# Patient Record
Sex: FEMALE | Race: WHITE | URBAN - METROPOLITAN AREA
[De-identification: names, ages, dates, MRNs, and addresses within clinical notes are randomized per-mention and may not be internally consistent; named-entity substitution may affect disease eponyms.]

---

## 2022-07-26 ENCOUNTER — OFFICE VISIT (OUTPATIENT)
Dept: URBAN - METROPOLITAN AREA CLINIC 13 | Facility: CLINIC | Age: 72
End: 2022-07-26
Payer: MEDICARE

## 2022-07-26 DIAGNOSIS — H35.3112 NONEXUDATIVE AGE-RELATED MACULAR DEGENERATION, RIGHT EYE, INTERMEDIATE DRY STAGE: ICD-10-CM

## 2022-07-26 DIAGNOSIS — H40.89 OTHER SPECIFIED GLAUCOMA: ICD-10-CM

## 2022-07-26 DIAGNOSIS — E11.3313 TYPE 2 DIAB WITH MODERATE NONP RTNOP WITH MACULAR EDEMA, BI: ICD-10-CM

## 2022-07-26 DIAGNOSIS — H25.12 AGE-RELATED NUCLEAR CATARACT, LEFT EYE: Primary | ICD-10-CM

## 2022-07-26 PROCEDURE — 99204 OFFICE O/P NEW MOD 45 MIN: CPT | Performed by: OPHTHALMOLOGY

## 2022-07-26 PROCEDURE — 76512 OPH US DX B-SCAN: CPT | Performed by: OPHTHALMOLOGY

## 2022-07-26 PROCEDURE — 92134 CPTRZ OPH DX IMG PST SGM RTA: CPT | Performed by: OPHTHALMOLOGY

## 2022-07-26 ASSESSMENT — INTRAOCULAR PRESSURE
OS: 20
OD: 18

## 2022-07-26 NOTE — IMPRESSION/PLAN
Impression: Age-related nuclear cataract, left eye: H25.12. B-scan OS 07/26/2022 = no mass/RD Plan: Mature cataract No retinal contraindication for cataract surgery. D/w patient that retinal/macular pathology may limit final visual outcome. Patient voices understanding.

## 2022-07-26 NOTE — IMPRESSION/PLAN
Impression: Other specified glaucoma: H40.89. Plan: PXF glaucoma Is starting latanaprost per her referring provider

## 2022-07-26 NOTE — IMPRESSION/PLAN
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 OCT OU = mild IRF OD no view OS
A1c = 6 Plan: The patient is a type 2 diabetic with nonproliferative diabetic retinopathy (NPDR) and diabetic macular edema that is fortunately not clinically significant. BS/BP control emphasized with patient. We reviewed careful observation for progression. We also reviewed potential treatment options in the future including focal laser, intravitreal injections, and research participation. Rec obs as per protocol V. The patient elects to observe and return 3 months 3m OCT OU re-eval Avastin

## 2022-08-30 ENCOUNTER — OFFICE VISIT (OUTPATIENT)
Dept: URBAN - METROPOLITAN AREA CLINIC 13 | Facility: CLINIC | Age: 72
End: 2022-08-30
Payer: MEDICARE

## 2022-08-30 DIAGNOSIS — H40.89 OTHER SPECIFIED GLAUCOMA: ICD-10-CM

## 2022-08-30 DIAGNOSIS — E11.3313 TYPE 2 DIAB WITH MODERATE NONP RTNOP WITH MACULAR EDEMA, BI: ICD-10-CM

## 2022-08-30 DIAGNOSIS — H25.12 AGE-RELATED NUCLEAR CATARACT, LEFT EYE: Primary | ICD-10-CM

## 2022-08-30 DIAGNOSIS — H35.3112 NONEXUDATIVE AGE-RELATED MACULAR DEGENERATION, RIGHT EYE, INTERMEDIATE DRY STAGE: ICD-10-CM

## 2022-08-30 PROCEDURE — 92134 CPTRZ OPH DX IMG PST SGM RTA: CPT | Performed by: OPHTHALMOLOGY

## 2022-08-30 PROCEDURE — 99213 OFFICE O/P EST LOW 20 MIN: CPT | Performed by: OPHTHALMOLOGY

## 2022-08-30 ASSESSMENT — INTRAOCULAR PRESSURE
OS: 13
OD: 9

## 2022-08-30 NOTE — IMPRESSION/PLAN
Impression: Other specified glaucoma: H40.89. Plan: PXF glaucoma IOP ok on latanaprost per her referring provider

## 2022-08-30 NOTE — IMPRESSION/PLAN
Impression: Age-related nuclear cataract, left eye: H25.12. B-scan OS 07/26/2022 = no mass/RD Plan: Mature cataract. D/w Dr. Quentin Benoit on the phone. Agree that she may not be optimal candidate for standard phaco, whereas posterior approach would be better option. We discussed the risks, benefits, indications, and alternatives to vitrectomy and lensectomy. The risks include, but are not limited to infection, retinal tear or detachment, glaucoma, hemorrhage, loss of eye and blindness, chronic CME, and need for additional surgery. The patient understands that with vitrectomy, the vision can improve, but sometimes does not improve fully and sometimes the vision does not improve at all. Also, it can take months to years for the vision to improve. The patient elects to proceed with vitrectomy surgery/sutured lens vs ACIOL 25g PPV/PPL (fragmentome)/sutured IOL vs ACIOL OS [place]. NO GAS for surgery. D/w patient the possible need for SO and then staged SO removal in the future. Patient voices understanding.

## 2022-08-30 NOTE — IMPRESSION/PLAN
Impression: Type 2 diab with moderate nonp rtnop with macular edema, bi: [id number]. OCT OU = mild IRF OD , no view OS
A1c = 6 Plan: The patient is a type 2 diabetic with nonproliferative diabetic retinopathy (NPDR) and diabetic macular edema that is fortunately not clinically significant. BS/BP control emphasized with patient. We reviewed careful observation for progression. We also reviewed potential treatment options in the future including focal laser, intravitreal injections, and research participation. Rec obs as per protocol V. The patient elects to observe and return 3 months 3m OCT OU re-eval Avastin

## 2022-09-28 ENCOUNTER — POST-OPERATIVE VISIT (OUTPATIENT)
Dept: URBAN - METROPOLITAN AREA EXTERNAL CLINIC 14 | Facility: EXTERNAL CLINIC | Age: 72
End: 2022-09-28
Payer: MEDICARE

## 2022-09-28 DIAGNOSIS — H25.12 AGE-RELATED NUCLEAR CATARACT, LEFT EYE: Primary | ICD-10-CM

## 2022-09-28 PROCEDURE — 99024 POSTOP FOLLOW-UP VISIT: CPT | Performed by: OPHTHALMOLOGY

## 2022-09-28 ASSESSMENT — INTRAOCULAR PRESSURE: OD: 14

## 2022-09-28 NOTE — IMPRESSION/PLAN
Impression: S/P 25g PPV/PPL (fragmentome)/sutured IOL vs ACIOL OS - . Age-related nuclear cataract, left eye  H25.12. Plan: No s/s of RD/infection VA on recheck = CF 1 IOP low, but AC formed, no leak, and no choroidals - rec obs Repatched after exam.  Keep patch on x 24 hours then remove - instructions given. Start using drops as below tomorrow. Post-operative instructions and precautions Reviewed. 
Call ASAP with changes
--Continue Ocuflox QID--Continue PF QID

1 week POS

## 2022-10-07 ENCOUNTER — POST-OPERATIVE VISIT (OUTPATIENT)
Dept: URBAN - METROPOLITAN AREA CLINIC 86 | Facility: CLINIC | Age: 72
End: 2022-10-07
Payer: MEDICARE

## 2022-10-07 DIAGNOSIS — H25.12 AGE-RELATED NUCLEAR CATARACT, LEFT EYE: Primary | ICD-10-CM

## 2022-10-07 PROCEDURE — 99024 POSTOP FOLLOW-UP VISIT: CPT | Performed by: OPHTHALMOLOGY

## 2022-10-07 ASSESSMENT — INTRAOCULAR PRESSURE
OD: 17
OS: 9

## 2022-10-07 NOTE — IMPRESSION/PLAN
Impression: S/P 25g PPV/PPL (fragmentome)/sutured IOL vs ACIOL OS - 9 Days. Age-related nuclear cataract, left eye  H25.12. Plan: Continue PF QID OS. Stop Oflox Continue Lantoprost

RTC As scheduled with Dr. Patricia Souza

## 2022-11-02 ENCOUNTER — POST-OPERATIVE VISIT (OUTPATIENT)
Dept: URBAN - METROPOLITAN AREA CLINIC 54 | Facility: CLINIC | Age: 72
End: 2022-11-02
Payer: MEDICARE

## 2022-11-02 DIAGNOSIS — H25.12 AGE-RELATED NUCLEAR CATARACT, LEFT EYE: Primary | ICD-10-CM

## 2022-11-02 PROCEDURE — 99024 POSTOP FOLLOW-UP VISIT: CPT | Performed by: OPHTHALMOLOGY

## 2022-11-02 ASSESSMENT — INTRAOCULAR PRESSURE
OD: 14
OS: 19

## 2022-11-02 NOTE — IMPRESSION/PLAN
Impression: S/P 25g PPV/PPL (fragmentome) ACIOL OS - 35 Days. Age-related nuclear cataract, left eye  H25.12.
OCT OS - ERM with tr IRF OS  Plan: No s/s of RD/infection VA/IOP acceptable ACIOL stable and centered ERM, but mild - rec obs Post-operative instructions and precautions Reviewed. Taper PF TID x 1 week, BID x 1 week, daily x 1 week and CSM with daily Call ASAP with changes Some loose  sutures. Rec removal at slit lamp. RBA's d/w patient including infection risk. Patient agrees to proceed. Sutures removed at slit lamp post betadine application in sterile fashion without complication. Antibiotic drop applied. Patient knows to call ASAP with changes. 

1 week cornea eval (no dilation) with us or Dr. Regina Denise
then 3m OCT OU

## 2022-11-11 ENCOUNTER — POST-OPERATIVE VISIT (OUTPATIENT)
Dept: URBAN - METROPOLITAN AREA CLINIC 87 | Facility: CLINIC | Age: 72
End: 2022-11-11
Payer: MEDICARE

## 2022-11-11 DIAGNOSIS — H25.12 AGE-RELATED NUCLEAR CATARACT, LEFT EYE: Primary | ICD-10-CM

## 2022-11-11 PROCEDURE — 99024 POSTOP FOLLOW-UP VISIT: CPT | Performed by: OPHTHALMOLOGY

## 2022-11-11 ASSESSMENT — INTRAOCULAR PRESSURE
OD: 19
OS: 23

## 2022-11-11 NOTE — IMPRESSION/PLAN
Impression: S/P 25g PPV/PPL (fragmentome)/sutured IOL vs ACIOL OS - 44 Days. Age-related nuclear cataract, left eye  H25.12. Plan: Taper off PF 3-2-1 weekly. 

RTC as scheduled with CLEVE

## 2023-02-01 ENCOUNTER — OFFICE VISIT (OUTPATIENT)
Dept: URBAN - METROPOLITAN AREA CLINIC 13 | Facility: CLINIC | Age: 73
End: 2023-02-01
Payer: MEDICARE

## 2023-02-01 DIAGNOSIS — H35.3112 NONEXUDATIVE AGE-RELATED MACULAR DEGENERATION, RIGHT EYE, INTERMEDIATE DRY STAGE: ICD-10-CM

## 2023-02-01 DIAGNOSIS — H35.81 RETINAL EDEMA: ICD-10-CM

## 2023-02-01 DIAGNOSIS — E11.3313 TYPE 2 DIAB WITH MODERATE NONP RTNOP WITH MACULAR EDEMA, BI: ICD-10-CM

## 2023-02-01 DIAGNOSIS — H40.89 OTHER SPECIFIED GLAUCOMA: ICD-10-CM

## 2023-02-01 DIAGNOSIS — H25.12 AGE-RELATED NUCLEAR CATARACT, LEFT EYE: Primary | ICD-10-CM

## 2023-02-01 PROCEDURE — 92235 FLUORESCEIN ANGRPH MLTIFRAME: CPT | Performed by: OPHTHALMOLOGY

## 2023-02-01 PROCEDURE — 92250 FUNDUS PHOTOGRAPHY W/I&R: CPT | Performed by: OPHTHALMOLOGY

## 2023-02-01 PROCEDURE — 92134 CPTRZ OPH DX IMG PST SGM RTA: CPT | Performed by: OPHTHALMOLOGY

## 2023-02-01 PROCEDURE — 99214 OFFICE O/P EST MOD 30 MIN: CPT | Performed by: OPHTHALMOLOGY

## 2023-02-01 RX ORDER — PREDNISOLONE ACETATE 10 MG/ML
1 % SUSPENSION/ DROPS OPHTHALMIC
Qty: 10 | Refills: 3 | Status: INACTIVE
Start: 2023-02-01 | End: 2023-03-02

## 2023-02-01 RX ORDER — KETOROLAC TROMETHAMINE 5 MG/ML
0.5 % SOLUTION OPHTHALMIC
Qty: 5 | Refills: 3 | Status: INACTIVE
Start: 2023-02-01 | End: 2023-03-02

## 2023-02-01 ASSESSMENT — INTRAOCULAR PRESSURE
OD: 18
OS: 25

## 2023-02-01 NOTE — IMPRESSION/PLAN
Impression: Other specified glaucoma: H40.89. Plan: PXF glaucoma IOP ok on timolol and latanaprost 
rec f/u with her referring provider

## 2023-02-01 NOTE — IMPRESSION/PLAN
Impression: Retinal edema: H35.81. Plan: The patient has cystoid macular edema. We reviewed treatment options including observation, topical NSAID and corticosteroid eye drops, subtenon's steroid injections, intravitreal steroid injections, and possibly anti-VEGF injections. We will begin conservatively with topical NSAID drops and corticosteroid eye drops. If the IOP is acceptable and significant CME remains, then we will consider a subtenon's or intravitreal steroid injection at the patient's next appointment. Does have inflammatory component - will check for Jamal Mccarty in the future 1m OCT OU re-eval Jamal Mccarty

## 2023-02-01 NOTE — IMPRESSION/PLAN
Impression: Type 2 diab with moderate nonp rtnop with macular edema, bi: [id number]. 
 Plan: Please see above

## 2023-02-01 NOTE — IMPRESSION/PLAN
Impression: Age-related nuclear cataract, left eye  H25.12. S/P 25g PPV/PPL (fragmentome) ACIOL OS (09/28/2022) OCT OU - no IRF/SRF OD, ERM with tr IRF OS  / 477 FA OU 02/01/2023 = stain of MAs but no leak/non-perfusion OD stain of MAs with dense leak around nerve and in mac c/w inflammation and CME OS Plan: ACIOL stable and centered ERM, but mild - rec obs Now with sig central edema - likely CME but cannot effectively r/o DME, given NPDR
rec Optos FA OU transit OS = c/w CME Please see retinal edema diagnosis

## 2023-03-08 ENCOUNTER — OFFICE VISIT (OUTPATIENT)
Dept: URBAN - METROPOLITAN AREA CLINIC 13 | Facility: CLINIC | Age: 73
End: 2023-03-08
Payer: MEDICARE

## 2023-03-08 DIAGNOSIS — H35.3112 NONEXUDATIVE AGE-RELATED MACULAR DEGENERATION, RIGHT EYE, INTERMEDIATE DRY STAGE: ICD-10-CM

## 2023-03-08 DIAGNOSIS — H35.81 RETINAL EDEMA: ICD-10-CM

## 2023-03-08 DIAGNOSIS — H40.89 OTHER SPECIFIED GLAUCOMA: ICD-10-CM

## 2023-03-08 DIAGNOSIS — H25.12 AGE-RELATED NUCLEAR CATARACT, LEFT EYE: Primary | ICD-10-CM

## 2023-03-08 DIAGNOSIS — E11.3313 TYPE 2 DIAB WITH MODERATE NONP RTNOP WITH MACULAR EDEMA, BI: ICD-10-CM

## 2023-03-08 PROCEDURE — 99212 OFFICE O/P EST SF 10 MIN: CPT | Performed by: OPHTHALMOLOGY

## 2023-03-08 PROCEDURE — 92134 CPTRZ OPH DX IMG PST SGM RTA: CPT | Performed by: OPHTHALMOLOGY

## 2023-03-08 ASSESSMENT — INTRAOCULAR PRESSURE
OS: 20
OD: 14

## 2023-03-08 NOTE — IMPRESSION/PLAN
Impression: Age-related nuclear cataract, left eye  H25.12. S/P 25g PPV/PPL (fragmentome) ACIOL OS (09/28/2022) OCT OU - no IRF/SRF OD, ERM with tr IRF OS  / 310 FA OU (02/01/2023) = stain of MAs but no leak/non-perfusion OD stain of MAs with dense leak around nerve and in mac c/w inflammation and CME OS Plan: ACIOL stable and centered ERM, but mild - rec obs Now with resolved central edema Please see retinal edema diagnosis

## 2023-03-08 NOTE — IMPRESSION/PLAN
Impression: Retinal edema: H35.81. Plan: Resolved on PF/Acular QID Taper to TID x 2 weeks, then stay on BID

2-3m OCT OU re-joaquin Machado

## 2023-03-08 NOTE — IMPRESSION/PLAN
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  Plan: Please see above

## 2023-05-09 ENCOUNTER — OFFICE VISIT (OUTPATIENT)
Dept: URBAN - METROPOLITAN AREA CLINIC 13 | Facility: CLINIC | Age: 73
End: 2023-05-09
Payer: MEDICARE

## 2023-05-09 DIAGNOSIS — H35.81 RETINAL EDEMA: ICD-10-CM

## 2023-05-09 DIAGNOSIS — H25.12 AGE-RELATED NUCLEAR CATARACT, LEFT EYE: Primary | ICD-10-CM

## 2023-05-09 DIAGNOSIS — H35.3112 NONEXUDATIVE AGE-RELATED MACULAR DEGENERATION, RIGHT EYE, INTERMEDIATE DRY STAGE: ICD-10-CM

## 2023-05-09 DIAGNOSIS — E11.3313 TYPE 2 DIAB WITH MODERATE NONP RTNOP WITH MACULAR EDEMA, BI: ICD-10-CM

## 2023-05-09 DIAGNOSIS — H40.89 OTHER SPECIFIED GLAUCOMA: ICD-10-CM

## 2023-05-09 PROCEDURE — 92134 CPTRZ OPH DX IMG PST SGM RTA: CPT | Performed by: OPHTHALMOLOGY

## 2023-05-09 PROCEDURE — 99213 OFFICE O/P EST LOW 20 MIN: CPT | Performed by: OPHTHALMOLOGY

## 2023-05-09 ASSESSMENT — INTRAOCULAR PRESSURE
OD: 18
OS: 18

## 2023-05-09 NOTE — IMPRESSION/PLAN
Impression: Type 2 diab with moderate nonp rtnop with macular edema, bi: [id number].  Plan: Please see above

## 2023-05-09 NOTE — IMPRESSION/PLAN
Impression: Retinal edema: H35.81. Plan: Remains resolved on PF/Acular BID

RBA's of CSM with BID or taper to daily. Patient elects taper PF and Acular to daily May consider d/c in the future. 

6 weeks OCT OU re-eval Xipere

## 2023-05-09 NOTE — IMPRESSION/PLAN
Impression: Age-related nuclear cataract, left eye  H25.12. S/P 25g PPV/PPL (fragmentome) ACIOL OS (09/28/2022) OCT OU - no IRF/SRF OD, ERM with tr IRF OS  / 347 FA OU (02/01/2023) = stain of MAs but no leak/non-perfusion OD stain of MAs with dense leak around nerve and in mac c/w inflammation and CME OS Plan: ACIOL stable and centered ERM, but mild - rec obs Now with resolved central edema Please see retinal edema diagnosis

## 2023-06-23 ENCOUNTER — OFFICE VISIT (OUTPATIENT)
Dept: URBAN - METROPOLITAN AREA CLINIC 87 | Facility: CLINIC | Age: 73
End: 2023-06-23
Payer: MEDICARE

## 2023-06-23 DIAGNOSIS — H35.3112 NONEXUDATIVE AGE-RELATED MACULAR DEGENERATION, RIGHT EYE, INTERMEDIATE DRY STAGE: ICD-10-CM

## 2023-06-23 DIAGNOSIS — E11.3313 TYPE 2 DIABETES MELLITUS WITH MODERATE NONPROLIFERATIVE DIABETIC RETINOPATHY WITH MACULAR EDEMA, BILATERAL: ICD-10-CM

## 2023-06-23 DIAGNOSIS — H25.12 AGE-RELATED NUCLEAR CATARACT, LEFT EYE: ICD-10-CM

## 2023-06-23 DIAGNOSIS — H40.89 OTHER SPECIFIED GLAUCOMA: ICD-10-CM

## 2023-06-23 DIAGNOSIS — H35.81 RETINAL EDEMA: Primary | ICD-10-CM

## 2023-06-23 PROCEDURE — 99213 OFFICE O/P EST LOW 20 MIN: CPT | Performed by: OPHTHALMOLOGY

## 2023-06-23 PROCEDURE — 92134 CPTRZ OPH DX IMG PST SGM RTA: CPT | Performed by: OPHTHALMOLOGY

## 2023-06-23 ASSESSMENT — INTRAOCULAR PRESSURE
OD: 16
OS: 20

## 2023-06-23 NOTE — IMPRESSION/PLAN
Impression: Type 2 diab with moderate nonp rtnop with macular edema, bi: [id number].  Plan: Please see above

## 2023-06-23 NOTE — IMPRESSION/PLAN
Impression: Age-related nuclear cataract, left eye  H25.12. S/P 25g PPV/PPL (fragmentome) ACIOL OS (09/28/2022) OCT OU - no IRF/SRF OD, ERM with tr IRF OS  / 347 FA OU (02/01/2023) = stain of MAs but no leak/non-perfusion OD stain of MAs with dense leak around nerve and in mac c/w inflammation and CME OS Plan: ACIOL stable and centered ERM, but mild - rec obs

## 2023-06-23 NOTE — IMPRESSION/PLAN
Impression: Retinal edema: H35.81.

OCT: 
OD: Drusen OS: Drusen, ERM, IRF Plan: Recurrent with PF/Acular QDAY. Better on BID dosing. Restart BID dosing 6 weeks OCT OU re-joaquin [email or web address]

## 2023-09-29 ENCOUNTER — OFFICE VISIT (OUTPATIENT)
Dept: URBAN - METROPOLITAN AREA CLINIC 87 | Facility: CLINIC | Age: 73
End: 2023-09-29
Payer: MEDICARE

## 2023-09-29 DIAGNOSIS — H25.12 AGE-RELATED NUCLEAR CATARACT, LEFT EYE: ICD-10-CM

## 2023-09-29 DIAGNOSIS — H35.81 RETINAL EDEMA: Primary | ICD-10-CM

## 2023-09-29 DIAGNOSIS — E11.3313 TYPE 2 DIAB WITH MODERATE NONP RTNOP WITH MACULAR EDEMA, BI: ICD-10-CM

## 2023-09-29 DIAGNOSIS — H35.372 PUCKERING OF MACULA, LEFT EYE: ICD-10-CM

## 2023-09-29 DIAGNOSIS — H40.89 OTHER SPECIFIED GLAUCOMA: ICD-10-CM

## 2023-09-29 PROCEDURE — 99214 OFFICE O/P EST MOD 30 MIN: CPT | Performed by: OPHTHALMOLOGY

## 2023-09-29 PROCEDURE — 92134 CPTRZ OPH DX IMG PST SGM RTA: CPT | Performed by: OPHTHALMOLOGY

## 2023-09-29 ASSESSMENT — INTRAOCULAR PRESSURE
OS: 21
OD: 19

## 2023-12-08 ENCOUNTER — OFFICE VISIT (OUTPATIENT)
Dept: URBAN - METROPOLITAN AREA CLINIC 87 | Facility: CLINIC | Age: 73
End: 2023-12-08
Payer: MEDICARE

## 2023-12-08 DIAGNOSIS — H35.81 RETINAL EDEMA: Primary | ICD-10-CM

## 2023-12-08 DIAGNOSIS — H40.89 OTHER SPECIFIED GLAUCOMA: ICD-10-CM

## 2023-12-08 DIAGNOSIS — E11.3313 TYPE 2 DIABETES MELLITUS WITH MODERATE NONPROLIFERATIVE DIABETIC RETINOPATHY WITH MACULAR EDEMA, BILATERAL: ICD-10-CM

## 2023-12-08 DIAGNOSIS — H25.12 AGE-RELATED NUCLEAR CATARACT, LEFT EYE: ICD-10-CM

## 2023-12-08 DIAGNOSIS — H35.372 PUCKERING OF MACULA, LEFT EYE: ICD-10-CM

## 2023-12-08 PROCEDURE — 99214 OFFICE O/P EST MOD 30 MIN: CPT | Performed by: OPHTHALMOLOGY

## 2023-12-08 PROCEDURE — 92134 CPTRZ OPH DX IMG PST SGM RTA: CPT | Performed by: OPHTHALMOLOGY

## 2023-12-08 ASSESSMENT — INTRAOCULAR PRESSURE
OD: 15
OS: 14

## 2024-06-17 ENCOUNTER — OFFICE VISIT (OUTPATIENT)
Facility: LOCATION | Age: 74
End: 2024-06-17
Payer: MEDICARE

## 2024-06-17 DIAGNOSIS — H40.89 OTHER SPECIFIED GLAUCOMA: ICD-10-CM

## 2024-06-17 DIAGNOSIS — H35.81 RETINAL EDEMA: Primary | ICD-10-CM

## 2024-06-17 DIAGNOSIS — H35.372 PUCKERING OF MACULA, LEFT EYE: ICD-10-CM

## 2024-06-17 DIAGNOSIS — E11.3313 TYPE 2 DIABETES MELLITUS WITH MODERATE NONPROLIFERATIVE DIABETIC RETINOPATHY WITH MACULAR EDEMA, BILATERAL: ICD-10-CM

## 2024-06-17 PROCEDURE — 92134 CPTRZ OPH DX IMG PST SGM RTA: CPT | Performed by: OPHTHALMOLOGY

## 2024-06-17 PROCEDURE — 99214 OFFICE O/P EST MOD 30 MIN: CPT | Performed by: OPHTHALMOLOGY

## 2024-06-17 ASSESSMENT — INTRAOCULAR PRESSURE
OS: 23
OD: 17

## 2024-11-01 ENCOUNTER — OFFICE VISIT (OUTPATIENT)
Dept: URBAN - METROPOLITAN AREA CLINIC 86 | Facility: CLINIC | Age: 74
End: 2024-11-01
Payer: MEDICARE

## 2024-11-01 DIAGNOSIS — E11.3313 TYPE 2 DIAB WITH MODERATE NONP RTNOP WITH MACULAR EDEMA, BI: ICD-10-CM

## 2024-11-01 DIAGNOSIS — H35.372 PUCKERING OF MACULA, LEFT EYE: ICD-10-CM

## 2024-11-01 DIAGNOSIS — H35.81 RETINAL EDEMA: Primary | ICD-10-CM

## 2024-11-01 DIAGNOSIS — H40.89 OTHER SPECIFIED GLAUCOMA: ICD-10-CM

## 2024-11-01 PROCEDURE — 92134 CPTRZ OPH DX IMG PST SGM RTA: CPT | Performed by: OPHTHALMOLOGY

## 2024-11-01 PROCEDURE — 99214 OFFICE O/P EST MOD 30 MIN: CPT | Performed by: OPHTHALMOLOGY

## 2024-11-01 ASSESSMENT — INTRAOCULAR PRESSURE
OD: 15
OS: 15

## 2024-11-25 ENCOUNTER — POST-OPERATIVE VISIT (OUTPATIENT)
Dept: URBAN - METROPOLITAN AREA CLINIC 13 | Facility: CLINIC | Age: 74
End: 2024-11-25
Payer: MEDICARE

## 2024-11-25 DIAGNOSIS — H35.372 PUCKERING OF MACULA, LEFT EYE: Primary | ICD-10-CM

## 2024-11-25 PROCEDURE — 99024 POSTOP FOLLOW-UP VISIT: CPT | Performed by: OPHTHALMOLOGY

## 2024-11-25 ASSESSMENT — INTRAOCULAR PRESSURE
OS: 9
OD: 14

## 2024-12-06 ENCOUNTER — POST-OPERATIVE VISIT (OUTPATIENT)
Age: 74
End: 2024-12-06
Payer: MEDICARE

## 2024-12-06 DIAGNOSIS — H35.372 PUCKERING OF MACULA, LEFT EYE: Primary | ICD-10-CM

## 2024-12-06 PROCEDURE — 99024 POSTOP FOLLOW-UP VISIT: CPT | Performed by: OPHTHALMOLOGY

## 2024-12-06 ASSESSMENT — INTRAOCULAR PRESSURE
OS: 15
OD: 9

## 2025-01-03 ENCOUNTER — POST-OPERATIVE VISIT (OUTPATIENT)
Age: 75
End: 2025-01-03
Payer: MEDICARE

## 2025-01-03 DIAGNOSIS — H35.372 PUCKERING OF MACULA, LEFT EYE: Primary | ICD-10-CM

## 2025-01-03 PROCEDURE — 92134 CPTRZ OPH DX IMG PST SGM RTA: CPT | Performed by: OPHTHALMOLOGY

## 2025-01-03 PROCEDURE — 99024 POSTOP FOLLOW-UP VISIT: CPT | Performed by: OPHTHALMOLOGY

## 2025-01-03 ASSESSMENT — INTRAOCULAR PRESSURE
OD: 13
OS: 17

## 2025-04-11 ENCOUNTER — OFFICE VISIT (OUTPATIENT)
Age: 75
End: 2025-04-11
Payer: MEDICARE

## 2025-04-11 DIAGNOSIS — E11.3313 TYPE 2 DIABETES MELLITUS WITH MODERATE NONPROLIFERATIVE DIABETIC RETINOPATHY WITH MACULAR EDEMA, BILATERAL: Primary | ICD-10-CM

## 2025-04-11 PROCEDURE — 67028 INJECTION EYE DRUG: CPT | Performed by: OPHTHALMOLOGY

## 2025-04-11 ASSESSMENT — INTRAOCULAR PRESSURE
OS: 17
OD: 14

## 2025-05-19 ENCOUNTER — OFFICE VISIT (OUTPATIENT)
Dept: URBAN - METROPOLITAN AREA CLINIC 86 | Facility: CLINIC | Age: 75
End: 2025-05-19
Payer: MEDICARE

## 2025-05-19 DIAGNOSIS — E11.3313 TYPE 2 DIABETES MELLITUS WITH MODERATE NONPROLIFERATIVE DIABETIC RETINOPATHY WITH MACULAR EDEMA, BILATERAL: Primary | ICD-10-CM

## 2025-05-19 PROCEDURE — 67028 INJECTION EYE DRUG: CPT | Performed by: OPHTHALMOLOGY

## 2025-05-19 PROCEDURE — 92134 CPTRZ OPH DX IMG PST SGM RTA: CPT | Performed by: OPHTHALMOLOGY

## 2025-05-19 ASSESSMENT — INTRAOCULAR PRESSURE
OD: 12
OS: 17

## 2025-06-16 ENCOUNTER — OFFICE VISIT (OUTPATIENT)
Dept: URBAN - METROPOLITAN AREA CLINIC 86 | Facility: CLINIC | Age: 75
End: 2025-06-16
Payer: MEDICARE

## 2025-06-16 DIAGNOSIS — E11.3313 TYPE 2 DIABETES MELLITUS WITH MODERATE NONPROLIFERATIVE DIABETIC RETINOPATHY WITH MACULAR EDEMA, BILATERAL: Primary | ICD-10-CM

## 2025-06-16 PROCEDURE — 67028 INJECTION EYE DRUG: CPT | Performed by: OPHTHALMOLOGY

## 2025-06-16 PROCEDURE — 92134 CPTRZ OPH DX IMG PST SGM RTA: CPT | Performed by: OPHTHALMOLOGY

## 2025-06-16 ASSESSMENT — INTRAOCULAR PRESSURE
OD: 15
OS: 15

## 2025-07-18 ENCOUNTER — OFFICE VISIT (OUTPATIENT)
Age: 75
End: 2025-07-18
Payer: MEDICARE

## 2025-07-18 DIAGNOSIS — E11.3313 TYPE 2 DIABETES MELLITUS WITH MODERATE NONPROLIFERATIVE DIABETIC RETINOPATHY WITH MACULAR EDEMA, BILATERAL: Primary | ICD-10-CM

## 2025-07-18 PROCEDURE — 67028 INJECTION EYE DRUG: CPT | Performed by: OPHTHALMOLOGY

## 2025-07-18 PROCEDURE — 92134 CPTRZ OPH DX IMG PST SGM RTA: CPT | Performed by: OPHTHALMOLOGY

## 2025-07-18 ASSESSMENT — INTRAOCULAR PRESSURE
OD: 18
OS: 19

## 2025-08-29 ENCOUNTER — OFFICE VISIT (OUTPATIENT)
Age: 75
End: 2025-08-29
Payer: MEDICARE

## 2025-08-29 DIAGNOSIS — E11.3313 TYPE 2 DIABETES MELLITUS WITH MODERATE NONPROLIFERATIVE DIABETIC RETINOPATHY WITH MACULAR EDEMA, BILATERAL: Primary | ICD-10-CM

## 2025-08-29 PROCEDURE — 67028 INJECTION EYE DRUG: CPT | Performed by: OPHTHALMOLOGY

## 2025-08-29 PROCEDURE — 92134 CPTRZ OPH DX IMG PST SGM RTA: CPT | Performed by: OPHTHALMOLOGY

## 2025-08-29 ASSESSMENT — INTRAOCULAR PRESSURE
OS: 20
OD: 20